# Patient Record
Sex: MALE | Race: BLACK OR AFRICAN AMERICAN | NOT HISPANIC OR LATINO | Employment: FULL TIME | URBAN - METROPOLITAN AREA
[De-identification: names, ages, dates, MRNs, and addresses within clinical notes are randomized per-mention and may not be internally consistent; named-entity substitution may affect disease eponyms.]

---

## 2017-10-03 ENCOUNTER — HOSPITAL ENCOUNTER (EMERGENCY)
Facility: MEDICAL CENTER | Age: 27
End: 2017-10-03
Attending: EMERGENCY MEDICINE
Payer: MEDICAID

## 2017-10-03 VITALS
RESPIRATION RATE: 16 BRPM | HEART RATE: 75 BPM | HEIGHT: 67 IN | DIASTOLIC BLOOD PRESSURE: 70 MMHG | TEMPERATURE: 97.8 F | SYSTOLIC BLOOD PRESSURE: 121 MMHG | BODY MASS INDEX: 26.26 KG/M2 | WEIGHT: 167.33 LBS | OXYGEN SATURATION: 99 %

## 2017-10-03 DIAGNOSIS — M54.50 ACUTE BILATERAL LOW BACK PAIN WITHOUT SCIATICA: ICD-10-CM

## 2017-10-03 PROCEDURE — 700102 HCHG RX REV CODE 250 W/ 637 OVERRIDE(OP): Performed by: EMERGENCY MEDICINE

## 2017-10-03 PROCEDURE — A9270 NON-COVERED ITEM OR SERVICE: HCPCS | Performed by: EMERGENCY MEDICINE

## 2017-10-03 PROCEDURE — 99283 EMERGENCY DEPT VISIT LOW MDM: CPT

## 2017-10-03 RX ORDER — CYCLOBENZAPRINE HCL 10 MG
10 TABLET ORAL 3 TIMES DAILY PRN
Qty: 30 TAB | Refills: 0 | Status: SHIPPED | OUTPATIENT
Start: 2017-10-03 | End: 2017-10-23

## 2017-10-03 RX ORDER — IBUPROFEN 600 MG/1
600 TABLET ORAL ONCE
Status: COMPLETED | OUTPATIENT
Start: 2017-10-03 | End: 2017-10-03

## 2017-10-03 RX ORDER — NAPROXEN 500 MG/1
500 TABLET ORAL 2 TIMES DAILY WITH MEALS
Qty: 60 TAB | Refills: 0 | Status: SHIPPED | OUTPATIENT
Start: 2017-10-03 | End: 2017-10-23

## 2017-10-03 RX ORDER — CYCLOBENZAPRINE HCL 10 MG
10 TABLET ORAL ONCE
Status: DISCONTINUED | OUTPATIENT
Start: 2017-10-03 | End: 2017-10-03 | Stop reason: HOSPADM

## 2017-10-03 RX ADMIN — IBUPROFEN 600 MG: 600 TABLET, FILM COATED ORAL at 09:33

## 2017-10-03 ASSESSMENT — PAIN SCALES - GENERAL: PAINLEVEL_OUTOF10: 8

## 2017-10-03 NOTE — DISCHARGE INSTRUCTIONS
Back Pain, Adult  Back pain is very common in adults. The cause of back pain is rarely dangerous and the pain often gets better over time. The cause of your back pain may not be known. Some common causes of back pain include:  · Strain of the muscles or ligaments supporting the spine.  · Wear and tear (degeneration) of the spinal disks.  · Arthritis.  · Direct injury to the back.  For many people, back pain may return. Since back pain is rarely dangerous, most people can learn to manage this condition on their own.  HOME CARE INSTRUCTIONS  Watch your back pain for any changes. The following actions may help to lessen any discomfort you are feeling:  · Remain active. It is stressful on your back to sit or  one place for long periods of time. Do not sit, drive, or  one place for more than 30 minutes at a time. Take short walks on even surfaces as soon as you are able. Try to increase the length of time you walk each day.  · Exercise regularly as directed by your health care provider. Exercise helps your back heal faster. It also helps avoid future injury by keeping your muscles strong and flexible.  · Do not stay in bed. Resting more than 1-2 days can delay your recovery.  · Pay attention to your body when you bend and lift. The most comfortable positions are those that put less stress on your recovering back. Always use proper lifting techniques, including:  ¨ Bending your knees.  ¨ Keeping the load close to your body.  ¨ Avoiding twisting.  · Find a comfortable position to sleep. Use a firm mattress and lie on your side with your knees slightly bent. If you lie on your back, put a pillow under your knees.  · Avoid feeling anxious or stressed. Stress increases muscle tension and can worsen back pain. It is important to recognize when you are anxious or stressed and learn ways to manage it, such as with exercise.  · Take medicines only as directed by your health care provider. Over-the-counter  medicines to reduce pain and inflammation are often the most helpful. Your health care provider may prescribe muscle relaxant drugs. These medicines help dull your pain so you can more quickly return to your normal activities and healthy exercise.  · Apply ice to the injured area:  ¨ Put ice in a plastic bag.  ¨ Place a towel between your skin and the bag.  ¨ Leave the ice on for 20 minutes, 2-3 times a day for the first 2-3 days. After that, ice and heat may be alternated to reduce pain and spasms.  · Maintain a healthy weight. Excess weight puts extra stress on your back and makes it difficult to maintain good posture.  SEEK MEDICAL CARE IF:  · You have pain that is not relieved with rest or medicine.  · You have increasing pain going down into the legs or buttocks.  · You have pain that does not improve in one week.  · You have night pain.  · You lose weight.  · You have a fever or chills.  SEEK IMMEDIATE MEDICAL CARE IF:   · You develop new bowel or bladder control problems.  · You have unusual weakness or numbness in your arms or legs.  · You develop nausea or vomiting.  · You develop abdominal pain.  · You feel faint.     This information is not intended to replace advice given to you by your health care provider. Make sure you discuss any questions you have with your health care provider.     Document Released: 12/18/2006 Document Revised: 01/08/2016 Document Reviewed: 04/21/2015  [employer] Interactive Patient Education ©2016 [employer]

## 2017-10-03 NOTE — ED NOTES
"Pt ambulatory to triage c/o low back pain, described as \"muscle tightness\" started yesterday.  Denies any recent trauma/injury.   "

## 2017-10-03 NOTE — ED PROVIDER NOTES
"ED Provider Note    Scribed for Vandana Henry M.D. by Jonah Sanford. 10/3/2017  9:16 AM    Primary care provider: Pcp Pt States None  Means of arrival: walk in  History obtained from: patient  History limited by: none    CHIEF COMPLAINT  Chief Complaint   Patient presents with   • Low Back Pain     \"muscle tightness\" started yesterday       HPI  Betsy Pierce is a 26 y.o. male who presents to the Emergency Department complaining of worsening lumbar back pain starting yesterday. He describes his pain as severe. Patient states that he has not taken any medication for his pain. 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 Patient denies incontinence, saddle anaesthesia.      REVIEW OF SYSTEMS  GI: no vomiting, diarrhea, or abdominal pain   : no dysuria, back pain, or hematuria   Neuro: no weakness, numbness, aphasia, headache, incontinence, saddle anaesthesia.  Musculoskeletal: Positive back pain. no swelling, deformity, or joint swelling  Endocrine: no fevers, sweating, or weight loss   SKIN: no rash, erythema, or contusions     See history of present illness.   E.    PAST MEDICAL HISTORY   History of muscle spasms    SURGICAL HISTORY   has a past surgical history that includes hydrocelectomy adult (2010).    SOCIAL HISTORY  Social History   Substance Use Topics   • Smoking status: Current Every Day Smoker     Packs/day: 0.50     Types: Cigarettes   • Smokeless tobacco: No   • Alcohol use No      Comment: occ; quit 2015      History   Drug Use     Comment: marijuana       FAMILY HISTORY  None noted    CURRENT MEDICATIONS  No current facility-administered medications for this encounter.     Current Outpatient Prescriptions:   •  cyclobenzaprine (FLEXERIL) 10 MG Tab, Take 1 Tab by mouth 3 times a day as needed., Disp: 30 Tab, Rfl: 0  •  naproxen (NAPROSYN) 500 MG Tab, Take 1 Tab by mouth 2 times a day, with meals., Disp: 60 Tab, " "Rfl: 0  •  methylPREDNISolone (MEDROL, DONNA,) 4 MG tablet, Use as directed, Disp: 1 Kit, Rfl: 0  •  diazepam (VALIUM) 5 MG Tab, Take 1 Tab by mouth every 6 hours as needed (muscle spasm)., Disp: 30 Tab, Rfl: 0    ALLERGIES  No Known Allergies    PHYSICAL EXAM  VITAL SIGNS: /72   Pulse 72   Temp 36.6 °C (97.8 °F) (Temporal)   Resp 17   Ht 1.702 m (5' 7\")   Wt 75.9 kg (167 lb 5.3 oz)   SpO2 99%   BMI 26.21 kg/m²     Constitutional: Well developed, Well nourished, No acute distress, Non-toxic appearance.   HEENT: Normocephalic, Atraumatic,  external ears normal, pharynx pink,  Mucous  Membranes moist, No rhinorrhea or mucosal edema  Eyes: PERRL, EOMI, Conjunctiva normal, No discharge.   Neck: Normal range of motion, No tenderness, Supple, No stridor.   Lymphatic: No lymphadenopathy    Cardiovascular: Regular Rate and Rhythm, No murmurs,  rubs, or gallops.   Thorax & Lungs: Lungs clear to auscultation bilaterally, No respiratory distress, No wheezes, rhales or rhonchi, No chest wall tenderness.   Abdomen: Bowel sounds normal, Soft, non tender, non distended,  No pulsatile masses., no rebound guarding or peritoneal signs.   Skin: Warm, Dry, No erythema, No rash,   Back:  No CVA tenderness,  Palpable paraspinal muscule spasms. No spinal tenderness, bony crepitance, step offs, or instability.  Neurologic: Alert & oriented x 3, Normal motor function, Normal sensory function, No focal deficits noted. Normal reflexes. Normal Cranial Nerves. Normal DTRs  Extremities: Equal, intact distal pulses, No cyanosis, clubbing or edema,  No tenderness.   Musculoskeletal: Good range of motion in all major joints. No tenderness to palpation or major deformities noted.           COURSE & MEDICAL DECISION MAKING  Nursing notes, VS, PMSFHx reviewed in chart.    9:16 AM - Patient seen and examined at bedside. Patient presents with signs of muscle spasms. He will be referred to follow up with a primary in order to evaluate his " chronic condition. He is instructed to take the day off work and and administer antiinflammatories. He will be prescribed Flexeril 10 mg and Naprosyn 500 mg I instructed him to return with any new or worsening symptoms but specifically if he experiences saddle anesthesia or incontinence, indicating disc involvement. Patient verbalizes understanding and agreement to this plan of care.      The patient will return for new or worsening symptoms and is stable at the time of discharge.    The patient is referred to a primary physician for blood pressure management, diabetic screening, and for all other preventative health concerns.    DISPOSITION:  Patient will be discharged home in stable condition.    FOLLOW UP:  hospitals primary care    Call in 1 day        OUTPATIENT MEDICATIONS:  Discharge Medication List as of 10/3/2017  9:37 AM      START taking these medications    Details   cyclobenzaprine (FLEXERIL) 10 MG Tab Take 1 Tab by mouth 3 times a day as needed., Disp-30 Tab, R-0, Print Rx Paper      naproxen (NAPROSYN) 500 MG Tab Take 1 Tab by mouth 2 times a day, with meals., Disp-60 Tab, R-0, Print Rx Paper               FINAL IMPRESSION  1. Acute bilateral low back pain without sciatica          Jonah ARIAS (Scribe), am scribing for, and in the presence of, Vandana Henry M.D..    Electronically signed by: Jonah Sanford (Alfonso), 10/3/2017    Vandana ARIAS M.D. personally performed the services described in this documentation, as scribed by Jonah Sanford in my presence, and it is both accurate and complete.    The note accurately reflects work and decisions made by me.  Vandana Henry  10/3/2017  1:14 PM

## 2017-10-22 PROCEDURE — 99283 EMERGENCY DEPT VISIT LOW MDM: CPT

## 2017-10-22 ASSESSMENT — PAIN SCALES - GENERAL: PAINLEVEL_OUTOF10: 6

## 2017-10-23 ENCOUNTER — HOSPITAL ENCOUNTER (EMERGENCY)
Facility: MEDICAL CENTER | Age: 27
End: 2017-10-23
Attending: EMERGENCY MEDICINE
Payer: MEDICAID

## 2017-10-23 VITALS
WEIGHT: 167.55 LBS | BODY MASS INDEX: 26.3 KG/M2 | HEIGHT: 67 IN | TEMPERATURE: 98.8 F | DIASTOLIC BLOOD PRESSURE: 67 MMHG | SYSTOLIC BLOOD PRESSURE: 110 MMHG | RESPIRATION RATE: 20 BRPM | HEART RATE: 87 BPM | OXYGEN SATURATION: 99 %

## 2017-10-23 DIAGNOSIS — B34.9 VIRAL ILLNESS: ICD-10-CM

## 2017-10-23 PROCEDURE — A9270 NON-COVERED ITEM OR SERVICE: HCPCS | Performed by: EMERGENCY MEDICINE

## 2017-10-23 PROCEDURE — 700102 HCHG RX REV CODE 250 W/ 637 OVERRIDE(OP): Performed by: EMERGENCY MEDICINE

## 2017-10-23 RX ORDER — IBUPROFEN 600 MG/1
600 TABLET ORAL ONCE
Status: COMPLETED | OUTPATIENT
Start: 2017-10-23 | End: 2017-10-23

## 2017-10-23 RX ADMIN — IBUPROFEN 600 MG: 600 TABLET, FILM COATED ORAL at 02:06

## 2017-10-23 ASSESSMENT — PAIN SCALES - WONG BAKER: WONGBAKER_NUMERICALRESPONSE: HURTS JUST A LITTLE BIT

## 2017-10-23 NOTE — ED NOTES
PPT IS AOAX4, PT IS IN Ad, PT IS BEING D/C, PT WAS GIVEN D/C INSTRUCTIONS, PT IS IN NAD. PT IS ABLE TO AMB WOI ASSITANCE. PT IS ABLE TO AMB WOI ASSISTANCE.

## 2017-10-23 NOTE — ED NOTES
Betsy Pierce  26 y.o.  Chief Complaint   Patient presents with   • Cough     x1 week, productive, clear mucous.   • Nausea     deneis emesis   • Nasal Congestion     x1 week   • Headache     only when coughing     Patient denies taking medication for symptoms.      Explained wait time and triage process to pt. Pt placed back out in lobby, told to notify ED tech or triage RN of any changes, verbalized understanding.

## 2017-10-23 NOTE — DISCHARGE INSTRUCTIONS
"Viral Infections  A viral infection can be caused by different types of viruses. Most viral infections are not serious and resolve on their own. However, some infections may cause severe symptoms and may lead to further complications.  SYMPTOMS  Viruses can frequently cause:  · Minor sore throat.  · Aches and pains.  · Headaches.  · Runny nose.  · Different types of rashes.  · Watery eyes.  · Tiredness.  · Cough.  · Loss of appetite.  · Gastrointestinal infections, resulting in nausea, vomiting, and diarrhea.  These symptoms do not respond to antibiotics because the infection is not caused by bacteria. However, you might catch a bacterial infection following the viral infection. This is sometimes called a \"superinfection.\" Symptoms of such a bacterial infection may include:  · Worsening sore throat with pus and difficulty swallowing.  · Swollen neck glands.  · Chills and a high or persistent fever.  · Severe headache.  · Tenderness over the sinuses.  · Persistent overall ill feeling (malaise), muscle aches, and tiredness (fatigue).  · Persistent cough.  · Yellow, green, or brown mucus production with coughing.  HOME CARE INSTRUCTIONS   · Only take over-the-counter or prescription medicines for pain, discomfort, diarrhea, or fever as directed by your caregiver.  · Drink enough water and fluids to keep your urine clear or pale yellow. Sports drinks can provide valuable electrolytes, sugars, and hydration.  · Get plenty of rest and maintain proper nutrition. Soups and broths with crackers or rice are fine.  SEEK IMMEDIATE MEDICAL CARE IF:   · You have severe headaches, shortness of breath, chest pain, neck pain, or an unusual rash.  · You have uncontrolled vomiting, diarrhea, or you are unable to keep down fluids.  · You or your child has an oral temperature above 102° F (38.9° C), not controlled by medicine.  · Your baby is older than 3 months with a rectal temperature of 102° F (38.9° C) or higher.  · Your baby is 3 " months old or younger with a rectal temperature of 100.4° F (38° C) or higher.  MAKE SURE YOU:   · Understand these instructions.  · Will watch your condition.  · Will get help right away if you are not doing well or get worse.     This information is not intended to replace advice given to you by your health care provider. Make sure you discuss any questions you have with your health care provider.     Document Released: 09/27/2006 Document Revised: 03/11/2013 Document Reviewed: 04/23/2012  [employer] Interactive Patient Education ©2016 [employer]

## 2017-10-23 NOTE — ED PROVIDER NOTES
"ED Provider Note    CHIEF COMPLAINT  Chief Complaint   Patient presents with   • Cough     x1 week, productive, clear mucous.   • Nausea     deneis emesis   • Nasal Congestion     x1 week   • Headache     only when coughing       HPI  Betsy Pierce is a 26 y.o. male who presentsTo the emergency department with a cough. The patient states an sick over the last 3-5 days. He's had a cough as well as nasal congestion. Patient's also had associated headache when he coughs. He has not had any fevers. He does have some slight nausea but no vomiting. He has had sick contacts at home.    REVIEW OF SYSTEMS  No rashes    PHYSICAL EXAM  VITAL SIGNS: /67   Pulse 87   Temp 37.1 °C (98.8 °F)   Resp 20   Ht 1.702 m (5' 7\")   Wt 76 kg (167 lb 8.8 oz)   SpO2 99%   BMI 26.24 kg/m²   In general the patient does not appear toxic  Ears tympanic membranes are retracted bilaterally but nonerythematous, nares have swollen turbinates with rhinorrhea, oral pharynx not erythematous  Neck is supple without adenopathy  Pulmonary chest clear to auscultation bilaterally      COURSE & MEDICAL DECISION MAKING  Pertinent Labs & Imaging studies reviewed. (See chart for details)  This a 26-year-old male who presents to the emergency department with signs and symptoms consistent with a viral upper rest for infection. Therefore the patient be treated supportively. He'll return if he is acutely worse or does not have significant improvement in 48-72 hours.    FINAL IMPRESSION  1. Viral upper respiratory infection     Disposition  The patient will be discharged in stable condition      Electronically signed by: Gregor Montesinos, 10/23/2017 2:03 AM      "

## 2017-11-11 ENCOUNTER — HOSPITAL ENCOUNTER (EMERGENCY)
Facility: MEDICAL CENTER | Age: 27
End: 2017-11-11
Attending: EMERGENCY MEDICINE
Payer: MEDICAID

## 2017-11-11 VITALS
RESPIRATION RATE: 12 BRPM | TEMPERATURE: 98.7 F | WEIGHT: 162.7 LBS | HEART RATE: 80 BPM | BODY MASS INDEX: 25.54 KG/M2 | HEIGHT: 67 IN | SYSTOLIC BLOOD PRESSURE: 120 MMHG | OXYGEN SATURATION: 98 % | DIASTOLIC BLOOD PRESSURE: 68 MMHG

## 2017-11-11 DIAGNOSIS — M54.50 ACUTE EXACERBATION OF CHRONIC LOW BACK PAIN: ICD-10-CM

## 2017-11-11 DIAGNOSIS — G89.29 ACUTE EXACERBATION OF CHRONIC LOW BACK PAIN: ICD-10-CM

## 2017-11-11 DIAGNOSIS — S39.012A LUMBOSACRAL STRAIN, INITIAL ENCOUNTER: ICD-10-CM

## 2017-11-11 PROCEDURE — A9270 NON-COVERED ITEM OR SERVICE: HCPCS | Performed by: EMERGENCY MEDICINE

## 2017-11-11 PROCEDURE — 700111 HCHG RX REV CODE 636 W/ 250 OVERRIDE (IP): Performed by: EMERGENCY MEDICINE

## 2017-11-11 PROCEDURE — 700102 HCHG RX REV CODE 250 W/ 637 OVERRIDE(OP): Performed by: EMERGENCY MEDICINE

## 2017-11-11 PROCEDURE — 96372 THER/PROPH/DIAG INJ SC/IM: CPT

## 2017-11-11 PROCEDURE — 99284 EMERGENCY DEPT VISIT MOD MDM: CPT

## 2017-11-11 RX ORDER — DEXAMETHASONE SODIUM PHOSPHATE 4 MG/ML
10 INJECTION, SOLUTION INTRA-ARTICULAR; INTRALESIONAL; INTRAMUSCULAR; INTRAVENOUS; SOFT TISSUE ONCE
Status: COMPLETED | OUTPATIENT
Start: 2017-11-11 | End: 2017-11-11

## 2017-11-11 RX ORDER — KETOROLAC TROMETHAMINE 30 MG/ML
30 INJECTION, SOLUTION INTRAMUSCULAR; INTRAVENOUS ONCE
Status: COMPLETED | OUTPATIENT
Start: 2017-11-11 | End: 2017-11-11

## 2017-11-11 RX ORDER — IBUPROFEN 600 MG/1
600 TABLET ORAL EVERY 6 HOURS PRN
Qty: 20 TAB | Refills: 0 | Status: SHIPPED | OUTPATIENT
Start: 2017-11-11 | End: 2019-04-10

## 2017-11-11 RX ORDER — METHOCARBAMOL 500 MG/1
500 TABLET, FILM COATED ORAL ONCE
Status: COMPLETED | OUTPATIENT
Start: 2017-11-11 | End: 2017-11-11

## 2017-11-11 RX ORDER — METHOCARBAMOL 500 MG/1
500 TABLET, FILM COATED ORAL EVERY 6 HOURS PRN
Qty: 20 TAB | Refills: 0 | Status: SHIPPED | OUTPATIENT
Start: 2017-11-11 | End: 2019-04-10

## 2017-11-11 RX ADMIN — KETOROLAC TROMETHAMINE 30 MG: 30 INJECTION, SOLUTION INTRAMUSCULAR at 03:15

## 2017-11-11 RX ADMIN — DEXAMETHASONE SODIUM PHOSPHATE 10 MG: 4 INJECTION, SOLUTION INTRAMUSCULAR; INTRAVENOUS at 03:15

## 2017-11-11 RX ADMIN — METHOCARBAMOL 500 MG: 500 TABLET ORAL at 03:35

## 2017-11-11 ASSESSMENT — PAIN SCALES - GENERAL: PAINLEVEL_OUTOF10: 8

## 2017-11-11 NOTE — ED NOTES
Discharge instructions given. All questions answered. Prescriptions given x2. Pt to follow-up with PCP. Pt verbalized understanding. All belongings with pt. Pt to lobby self ambulatory.

## 2017-11-11 NOTE — DISCHARGE INSTRUCTIONS
Follow-up with primary care next week for reevaluation,, to establish care, medication management and close blood pressure monitoring.    Motrin and Robaxin every 6 hours as needed for back pain or spasm.    Slow stretching and range of motion exercises as described. Activity weightbearing as tolerated.    Return to the emergency department for persistent or worsening back pain, lower extremity weakness or paresthesias, incontinence or urinary retention, fever or other new concerns.      Lumbosacral Strain  Lumbosacral strain is a strain of any of the parts that make up your lumbosacral vertebrae. Your lumbosacral vertebrae are the bones that make up the lower third of your backbone. Your lumbosacral vertebrae are held together by muscles and tough, fibrous tissue (ligaments).   CAUSES   A sudden blow to your back can cause lumbosacral strain. Also, anything that causes an excessive stretch of the muscles in the low back can cause this strain. This is typically seen when people exert themselves strenuously, fall, lift heavy objects, bend, or crouch repeatedly.  RISK FACTORS  · Physically demanding work.  · Participation in pushing or pulling sports or sports that require a sudden twist of the back (tennis, golf, baseball).  · Weight lifting.  · Excessive lower back curvature.  · Forward-tilted pelvis.  · Weak back or abdominal muscles or both.  · Tight hamstrings.  SIGNS AND SYMPTOMS   Lumbosacral strain may cause pain in the area of your injury or pain that moves (radiates) down your leg.   DIAGNOSIS  Your health care provider can often diagnose lumbosacral strain through a physical exam. In some cases, you may need tests such as X-ray exams.   TREATMENT   Treatment for your lower back injury depends on many factors that your clinician will have to evaluate. However, most treatment will include the use of anti-inflammatory medicines.  HOME CARE INSTRUCTIONS   · Avoid hard physical activities (tennis, racquetball,  waterskiing) if you are not in proper physical condition for it. This may aggravate or create problems.  · If you have a back problem, avoid sports requiring sudden body movements. Swimming and walking are generally safer activities.  · Maintain good posture.  · Maintain a healthy weight.  · For acute conditions, you may put ice on the injured area.  ¨ Put ice in a plastic bag.  ¨ Place a towel between your skin and the bag.  ¨ Leave the ice on for 20 minutes, 2-3 times a day.  · When the low back starts healing, stretching and strengthening exercises may be recommended.  SEEK MEDICAL CARE IF:  · Your back pain is getting worse.  · You experience severe back pain not relieved with medicines.  SEEK IMMEDIATE MEDICAL CARE IF:   · You have numbness, tingling, weakness, or problems with the use of your arms or legs.  · There is a change in bowel or bladder control.  · You have increasing pain in any area of the body, including your belly (abdomen).  · You notice shortness of breath, dizziness, or feel faint.  · You feel sick to your stomach (nauseous), are throwing up (vomiting), or become sweaty.  · You notice discoloration of your toes or legs, or your feet get very cold.  MAKE SURE YOU:   · Understand these instructions.  · Will watch your condition.  · Will get help right away if you are not doing well or get worse.     This information is not intended to replace advice given to you by your health care provider. Make sure you discuss any questions you have with your health care provider.     Document Released: 09/27/2006 Document Revised: 01/08/2016 Document Reviewed: 08/06/2014  [employer] Interactive Patient Education ©2016 [employer]      Back Exercises  Back exercises help treat and prevent back injuries. The goal is to increase your strength in your belly (abdominal) and back muscles. These exercises can also help with flexibility. Start these exercises when told by your doctor.  HOME CARE  Back exercises  include:  Pelvic Tilt.  · Lie on your back with your knees bent. Tilt your pelvis until the lower part of your back is against the floor. Hold this position 5 to 10 sec. Repeat this exercise 5 to 10 times.  Knee to Chest.  · Pull 1 knee up against your chest and hold for 20 to 30 seconds. Repeat this with the other knee. This may be done with the other leg straight or bent, whichever feels better. Then, pull both knees up against your chest.  Sit-Ups or Curl-Ups.  · Bend your knees 90 degrees. Start with tilting your pelvis, and do a partial, slow sit-up. Only lift your upper half 30 to 45 degrees off the floor. Take at least 2 to 3 seonds for each sit-up. Do not do sit-ups with your knees out straight. If partial sit-ups are difficult, simply do the above but with only tightening your belly (abdominal) muscles and holding it as told.  Hip-Lift.  · Lie on your back with your knees flexed 90 degrees. Push down with your feet and shoulders as you raise your hips 2 inches off the floor. Hold for 10 seconds, repeat 5 to 10 times.  Back Arches.  · Lie on your stomach. Prop yourself up on bent elbows. Slowly press on your hands, causing an arch in your low back. Repeat 3 to 5 times.  Shoulder-Lifts.  · Lie face down with arms beside your body. Keep hips and belly pressed to floor as you slowly lift your head and shoulders off the floor.  Do not overdo your exercises. Be careful in the beginning. Exercises may cause you some mild back discomfort. If the pain lasts for more than 15 minutes, stop the exercises until you see your doctor. Improvement with exercise for back problems is slow.      This information is not intended to replace advice given to you by your health care provider. Make sure you discuss any questions you have with your health care provider.     Document Released: 01/20/2012 Document Revised: 03/11/2013 Document Reviewed: 02/11/2016  [employer] Interactive Patient Education ©2016 [employer]

## 2017-11-11 NOTE — ED NOTES
"Chief Complaint   Patient presents with   • Back Pain     /81   Pulse 89   Temp 37.1 °C (98.7 °F)   Resp 14   Ht 1.702 m (5' 7\")   Wt 73.8 kg (162 lb 11.2 oz)   SpO2 98%   BMI 25.48 kg/m²     Pt ambulatory to triage, steady on feet. Pt reports he normally has back spasms that come and go on their own, does not take any medication for it. States his back began to hurt yesterday afternoon and has gradually gotten worse and is \"here with a vengeance this time.\" Rates 8/10. Describes as entire lower back. Pt returned to [facility], educated on triage process and instructed to notify staff of worsening symptoms.   "

## 2017-11-11 NOTE — ED PROVIDER NOTES
"ED Provider Note    CHIEF COMPLAINT  Chief Complaint   Patient presents with   • Back Pain       HPI  Betsy Pierce is a 26 y.o. male who Ambulates to the emergency department for low back pain and spasm. Patient describes history of similar symptoms intermittently over the last 1 or 2 years. 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 2 days of progressive low back pain, tightness.  Pain    is nonradiating. No lower extremity weakness or paresthesias. No incontinence or urinary retention. Patient denies IV drug use. Denies abdominal pain. Denies fever or chills. Patient denies any new trauma, injury or strain pattern. No medications or been taken for discomfort. Patient states given Flexeril previously for similar complaints, but it was ineffective \"and just made me tired.\" Denies IV drug use.    REVIEW OF SYSTEMS  See HPI for further details.     PAST MEDICAL HISTORY   has a past medical history of Known health problems: none.    SOCIAL HISTORY  Social History     Social History Main Topics   • Smoking status: Current Every Day Smoker     Packs/day: 0.50     Types: Cigarettes   • Smokeless tobacco: Never Used   • Alcohol use No      Comment: occ; quit 2015   • Drug use:       Comment: marijuana   • Sexual activity: Not on file       SURGICAL HISTORY   has a past surgical history that includes hydrocelectomy adult (2010).    CURRENT MEDICATIONS  Home Medications     Reviewed by Anuja Novak R.N. (Registered Nurse) on 11/11/17 at 0236  Med List Status: Partial   Medication Last Dose Status   diazepam (VALIUM) 5 MG Tab 10/1/2017 Active                ALLERGIES  No Known Allergies    PHYSICAL EXAM  VITAL SIGNS: /81   Pulse 89   Temp 37.1 °C (98.7 °F)   Resp 14   Ht 1.702 m (5' 7\")   Wt 73.8 kg (162 lb 11.2 oz)   SpO2 98%   BMI 25.48 kg/m²   Pulse ox interpretation: I interpret " this pulse ox as normal.  Constitutional: Alert in no apparent distress.  HENT: Normocephalic, atraumatic. Bilateral external ears normal, Nose normal. Moist mucous membranes.    Eyes: Pupils are equal and reactive, Conjunctiva normal.   Neck: Normal range of motion, Supple.   Lymphatic: No lymphadenopathy noted.   Cardiovascular:Normal peripheral perfusion.  Thorax & Lungs: Nonlabored respirations.  Abdomen: Soft, non-distended, non-tender to palpation. No palpable or pulsatile masses.   Skin: Warm, Dry, No erythema, No rash.   Musculoskeletal: Good range of motion in all major joints. No major deformities noted. No midline tenderness to palpation thoracic or lumbar spine. Diffuse low lumbar paravertebral tenderness bilaterally that extends over the sacral region.  Neurologic: Alert , no gross focal deficit noted. 5 out of 5 strength bilateral lower extremities. Sensation intact to light touch, no saddle anesthesia. Straight leg raise intact bilaterally with mild discomfort. And relates independently.  Psychiatric: Affect normal, Judgment normal, Mood normal.     COURSE & MEDICAL DECISION MAKING  Nursing notes and vital signs were reviewed. (See chart for details)  The patients records were reviewed, history was obtained from the patient ;     Evaluation is most consistent with acute exacerbation of chronic low back pain, suspect lumbosacral strain. No history or clinical evidence for radiculopathy. Physical exam as described above with reproducible paravertebral tenderness, otherwise unremarkable and patient is neurologically intact. He patel weight and ambulates independently. Abdominal exam is benign. No history or clinical evidence to suggest cauda equina, epidural abscess or spinal cord compression syndrome. Vital signs are stable without fever or tachycardia. Patient given Decadron, Toradol and Robaxin in the emergency department.    Patient is stable for discharge at this time, anticipatory guidance  provided,, Motrin and Robaxin for discomfort or spasm, close follow-up is encouraged, and strict ED return instructions have been detailed. Patient is agreeable to the disposition and plan.    Patient's blood pressure was elevated in the emergency department, and has been referred to primary care for close monitoring.     reviewed, no pattern for concern.    FINAL IMPRESSION  (M54.5,  G89.29) Acute exacerbation of chronic low back pain  (S39.012A) Lumbosacral strain, initial encounter      Electronically signed by: Kaitlin Briggs, 11/11/2017 3:00 AM      This dictation was created using voice recognition software. The accuracy of the dictation is limited to the abilities of the software. I expect there may be some errors of grammar and possibly content. The nursing notes were reviewed and certain aspects of this information were incorporated into this note.

## 2017-11-14 ENCOUNTER — HOSPITAL ENCOUNTER (EMERGENCY)
Facility: MEDICAL CENTER | Age: 27
End: 2017-11-14
Attending: EMERGENCY MEDICINE
Payer: MEDICAID

## 2017-11-14 VITALS
WEIGHT: 167.11 LBS | HEIGHT: 67 IN | OXYGEN SATURATION: 98 % | HEART RATE: 73 BPM | TEMPERATURE: 98 F | DIASTOLIC BLOOD PRESSURE: 80 MMHG | SYSTOLIC BLOOD PRESSURE: 119 MMHG | RESPIRATION RATE: 23 BRPM | BODY MASS INDEX: 26.23 KG/M2

## 2017-11-14 DIAGNOSIS — R10.33 PERIUMBILICAL ABDOMINAL PAIN: ICD-10-CM

## 2017-11-14 DIAGNOSIS — F17.200 TOBACCO DEPENDENCE: ICD-10-CM

## 2017-11-14 LAB
ALBUMIN SERPL BCP-MCNC: 3.9 G/DL (ref 3.2–4.9)
ALBUMIN/GLOB SERPL: 1.4 G/DL
ALP SERPL-CCNC: 61 U/L (ref 30–99)
ALT SERPL-CCNC: 10 U/L (ref 2–50)
ANION GAP SERPL CALC-SCNC: 6 MMOL/L (ref 0–11.9)
AST SERPL-CCNC: 15 U/L (ref 12–45)
BASOPHILS # BLD AUTO: 0.7 % (ref 0–1.8)
BASOPHILS # BLD: 0.06 K/UL (ref 0–0.12)
BILIRUB SERPL-MCNC: 0.5 MG/DL (ref 0.1–1.5)
BUN SERPL-MCNC: 14 MG/DL (ref 8–22)
CALCIUM SERPL-MCNC: 9.1 MG/DL (ref 8.5–10.5)
CHLORIDE SERPL-SCNC: 106 MMOL/L (ref 96–112)
CO2 SERPL-SCNC: 28 MMOL/L (ref 20–33)
CREAT SERPL-MCNC: 0.77 MG/DL (ref 0.5–1.4)
EOSINOPHIL # BLD AUTO: 0.22 K/UL (ref 0–0.51)
EOSINOPHIL NFR BLD: 2.4 % (ref 0–6.9)
ERYTHROCYTE [DISTWIDTH] IN BLOOD BY AUTOMATED COUNT: 37.5 FL (ref 35.9–50)
GFR SERPL CREATININE-BSD FRML MDRD: >60 ML/MIN/1.73 M 2
GLOBULIN SER CALC-MCNC: 2.8 G/DL (ref 1.9–3.5)
GLUCOSE SERPL-MCNC: 88 MG/DL (ref 65–99)
HCT VFR BLD AUTO: 42.7 % (ref 42–52)
HGB BLD-MCNC: 14.6 G/DL (ref 14–18)
IMM GRANULOCYTES # BLD AUTO: 0.03 K/UL (ref 0–0.11)
IMM GRANULOCYTES NFR BLD AUTO: 0.3 % (ref 0–0.9)
LIPASE SERPL-CCNC: 24 U/L (ref 11–82)
LYMPHOCYTES # BLD AUTO: 3.57 K/UL (ref 1–4.8)
LYMPHOCYTES NFR BLD: 38.8 % (ref 22–41)
MCH RBC QN AUTO: 28.2 PG (ref 27–33)
MCHC RBC AUTO-ENTMCNC: 34.2 G/DL (ref 33.7–35.3)
MCV RBC AUTO: 82.4 FL (ref 81.4–97.8)
MONOCYTES # BLD AUTO: 0.81 K/UL (ref 0–0.85)
MONOCYTES NFR BLD AUTO: 8.8 % (ref 0–13.4)
NEUTROPHILS # BLD AUTO: 4.5 K/UL (ref 1.82–7.42)
NEUTROPHILS NFR BLD: 49 % (ref 44–72)
NRBC # BLD AUTO: 0 K/UL
NRBC BLD AUTO-RTO: 0 /100 WBC
PLATELET # BLD AUTO: 397 K/UL (ref 164–446)
PMV BLD AUTO: 8.9 FL (ref 9–12.9)
POTASSIUM SERPL-SCNC: 3.6 MMOL/L (ref 3.6–5.5)
PROT SERPL-MCNC: 6.7 G/DL (ref 6–8.2)
RBC # BLD AUTO: 5.18 M/UL (ref 4.7–6.1)
SODIUM SERPL-SCNC: 140 MMOL/L (ref 135–145)
WBC # BLD AUTO: 9.2 K/UL (ref 4.8–10.8)

## 2017-11-14 PROCEDURE — 99284 EMERGENCY DEPT VISIT MOD MDM: CPT

## 2017-11-14 PROCEDURE — 36415 COLL VENOUS BLD VENIPUNCTURE: CPT

## 2017-11-14 PROCEDURE — 96372 THER/PROPH/DIAG INJ SC/IM: CPT

## 2017-11-14 PROCEDURE — A9270 NON-COVERED ITEM OR SERVICE: HCPCS | Performed by: EMERGENCY MEDICINE

## 2017-11-14 PROCEDURE — 83690 ASSAY OF LIPASE: CPT

## 2017-11-14 PROCEDURE — 85025 COMPLETE CBC W/AUTO DIFF WBC: CPT

## 2017-11-14 PROCEDURE — 700111 HCHG RX REV CODE 636 W/ 250 OVERRIDE (IP): Performed by: EMERGENCY MEDICINE

## 2017-11-14 PROCEDURE — 700102 HCHG RX REV CODE 250 W/ 637 OVERRIDE(OP): Performed by: EMERGENCY MEDICINE

## 2017-11-14 PROCEDURE — 80053 COMPREHEN METABOLIC PANEL: CPT

## 2017-11-14 RX ORDER — DICYCLOMINE HCL 20 MG
20 TABLET ORAL EVERY 6 HOURS
Qty: 20 TAB | Refills: 0 | Status: SHIPPED | OUTPATIENT
Start: 2017-11-14 | End: 2019-04-10

## 2017-11-14 RX ORDER — NAPROXEN 250 MG/1
250 TABLET ORAL ONCE
Status: COMPLETED | OUTPATIENT
Start: 2017-11-14 | End: 2017-11-14

## 2017-11-14 RX ORDER — DICYCLOMINE HYDROCHLORIDE 10 MG/ML
20 INJECTION INTRAMUSCULAR ONCE
Status: COMPLETED | OUTPATIENT
Start: 2017-11-14 | End: 2017-11-14

## 2017-11-14 RX ORDER — NAPROXEN 500 MG/1
500 TABLET ORAL 2 TIMES DAILY WITH MEALS
Qty: 20 TAB | Refills: 0 | Status: SHIPPED | OUTPATIENT
Start: 2017-11-14 | End: 2017-11-26

## 2017-11-14 RX ORDER — ONDANSETRON 4 MG/1
4 TABLET, ORALLY DISINTEGRATING ORAL ONCE
Status: COMPLETED | OUTPATIENT
Start: 2017-11-14 | End: 2017-11-14

## 2017-11-14 RX ADMIN — NAPROXEN 250 MG: 250 TABLET ORAL at 02:19

## 2017-11-14 RX ADMIN — DICYCLOMINE HYDROCHLORIDE 20 MG: 10 INJECTION INTRAMUSCULAR at 02:19

## 2017-11-14 RX ADMIN — ONDANSETRON 4 MG: 4 TABLET, ORALLY DISINTEGRATING ORAL at 02:19

## 2017-11-14 NOTE — ED PROVIDER NOTES
"ED Provider Note    Scribed for Justus Bee M.D. by Justus Bee. 11/14/2017,  2:01 AM.    CHIEF COMPLAINT  Chief Complaint   Patient presents with   • Abdominal Pain     middle of abdomen x 1 day    • Nausea       HPI  Betsy Pierce is a 26 y.o. male who presents to the Emergency DepartmentFor abdominal pain this is his 4th visit since October 3, and the 2nd for abdominal pain. He reports moderate to severe pain around the umbilicus which started yesterday. He reports nausea but no vomiting, no fevers or chills. He reports that he was started on Robaxin 3 days ago for chronic back spasms and wonders if maybe the medication as the cause of his abdominal discomfort. Pain is worse with palpation, slightly relieved by lying on his stomach. He denies constipation or diarrhea. His last bowel movement was yesterday and was soft.      REVIEW OF SYSTEMS  See HPI for further details. All other systems are negative.     PAST MEDICAL HISTORY   has a past medical history of Known health problems: none.    SOCIAL HISTORY  Social History     Social History Main Topics   • Smoking status: Current Every Day Smoker     Packs/day: 0.50     Types: Cigarettes   • Smokeless tobacco: Never Used   • Alcohol use No      Comment: occ; quit 2015   • Drug use:       Comment: marijuana   • Sexual activity: Not on file     History   Drug Use     Comment: marijuana       SURGICAL HISTORY   has a past surgical history that includes hydrocelectomy adult (2010).    CURRENT MEDICATIONS  Home Medications     Reviewed by Seble Sal R.N. (Registered Nurse) on 11/14/17 at 0206  Med List Status: Unable to Obtain   Medication Last Dose Status   diazepam (VALIUM) 5 MG Tab 10/1/2017 Active   ibuprofen (MOTRIN) 600 MG Tab  Active   methocarbamol (ROBAXIN) 500 MG Tab  Active                ALLERGIES  No Known Allergies    PHYSICAL EXAM  VITAL SIGNS: /80   Pulse 73   Temp 36.7 °C (98 °F)   Resp (!) 23   Ht 1.702 m (5' 7\")   Wt " 75.8 kg (167 lb 1.7 oz)   SpO2 98%   BMI 26.17 kg/m²   Pulse ox interpretation: I interpret this pulse ox as normal.  Constitutional: Alert in no apparent distress.  HENT: No signs of trauma, Bilateral external ears normal, Nose normal.   Eyes: Conjunctiva normal, Non-icteric.   Neck: Normal range of motion, Supple, No stridor.   Lymphatic: No lymphadenopathy noted.   Cardiovascular: Regular rate and rhythm, no murmurs.   Thorax & Lungs: Normal breath sounds, No respiratory distress, No wheezing, No chest tenderness.   Abdomen: Bowel sounds normal, Soft, No tenderness, No masses, No pulsatile masses. No peritoneal signs.  Skin: Warm, Dry, No erythema, No rash.   Back: No CVA tenderness.  Extremities: Intact distal pulses, No edema, No cyanosis.  Musculoskeletal: Good range of motion in all major joints. No or major deformities noted.   Neurologic: Alert , Normal motor function, Normal sensory function, No focal deficits noted.   Psychiatric: Affect anxious, unusual, Judgment normal, Mood normal.     DIAGNOSTIC STUDIES / PROCEDURES    LABS  Labs Reviewed   CBC WITH DIFFERENTIAL - Abnormal; Notable for the following:        Result Value    MPV 8.9 (*)     All other components within normal limits   COMP METABOLIC PANEL   LIPASE   ESTIMATED GFR   POC URINALYSIS     All labs reviewed by me.    RADIOLOGY  No orders to display     The radiologist's interpretation of all radiological studies have been reviewed by me.    COURSE & MEDICAL DECISION MAKING  Nursing notes, VS, PMSFHx reviewed in chart.     2:01 AM Patient seen and examined at bedside. Differential diagnosis includes but is not limited to Abdominal pain, abdominal cramps, viral illness, medication side effect, far less likely pancreatitis, hepatobiliary disease or appendicitis, given his benign exam and normal vital signs. Ordered for laboratory tests to evaluate. Patient will be treated with Bentyl, Zofran, and Naprosyn for his symptoms.     This patient's  labs were unremarkable. There is no evidence of infection. His vital signs are unremarkable. He had no peritoneal signs, and there were no localizing findings on his abdominal exam. Explained that it is at least possible that he is early in infectious process such as appendicitis, and that he should return for worsening symptoms, but his symptoms at this point are not consistent with an emergent medical condition, and he can be safely discharged. He'll be prescribed Bentyl and Naprosyn for his symptoms.     The patient will return for new or worsening symptoms and is stable at the time of discharge.    The patient is referred to a primary physician for blood pressure management, diabetic screening, and for all other preventative health concerns.    DISPOSITION:  Patient will be discharged home in stable condition.    FOLLOW UP:  No follow-up provider specified.    OUTPATIENT MEDICATIONS:  Discharge Medication List as of 11/14/2017  3:05 AM      START taking these medications    Details   dicyclomine (BENTYL) 20 MG Tab Take 1 Tab by mouth every 6 hours., Disp-20 Tab, R-0, Print Rx Paper      naproxen (NAPROSYN) 500 MG Tab Take 1 Tab by mouth 2 times a day, with meals., Disp-20 Tab, R-0, Print Rx Paper               FINAL IMPRESSION  1. Periumbilical abdominal pain    2. Tobacco dependence

## 2017-11-14 NOTE — ED NOTES
"Triage notes    Pt c/o of abdominal pain in middle of abdomen started yesterday, pt denies vomiting, states regular BM's but was started on a new medication for his back spasms and is concerned it may be that.     .  Chief Complaint   Patient presents with   • Abdominal Pain     middle of abdomen x 1 day    • Nausea     ./80   Pulse 73   Temp 36.7 °C (98 °F)   Resp 16   Ht 1.702 m (5' 7\")   Wt 75.8 kg (167 lb 1.7 oz)   SpO2 98%   BMI 26.17 kg/m²     "

## 2017-11-14 NOTE — DISCHARGE INSTRUCTIONS
Your laboratory tests were normal. The cause of your abdominal pain is not clear. There is no evidence of any dangerous cause, but it could be too early in the course of something like appendicitis or other problem for us to see it on your lab tests. Use the medications that we have prescribed, follow up with her primary care doctor, and return to the emergency department for severe symptoms that can't be managed by your primary care doctor.    Abdominal Pain, Possible Early Appendicitis  Abdominal (belly) pain can be caused by many things. Your caregiver decides the seriousness of your pain by an exam and possibly blood tests and X-rays. Many cases can be observed and treated at home. Most abdominal pain in children is functional. This means it is not caused by a disease. It will probably improve without treatment.  At this time, your caregiver feels that the abdominal pain could possibly be caused by early appendicitis. This means that you will require follow-up. You may be allowed to go home but may need to return for re-examination and repeat lab work.   HOME CARE INSTRUCTIONS   · Do not take or give laxatives unless directed by your caregiver.   · Take pain medication only if ordered by your caregiver.   · Take no food or water by mouth unless instructed to do so by your caregiver.   SEEK IMMEDIATE MEDICAL CARE IF:   · The pain does not go away or becomes much worse.   · An oral temperature above 102° F (38.9° C) develops.   · Repeated vomiting occurs.   · Blood is being passed in stools (bright red or black tarry stools).   · You develop blood in the urine or cannot pass your urine.   · You develop severe pain in other parts of your body.   MAKE SURE YOU:   · Understand these instructions.   · Will watch your condition.   · Will get help right away if you are not doing well or get worse.   Document Released: 01/06/2009 Document Revised: 03/11/2013 Document Reviewed: 01/06/2009  [employer]® Patient Information  ©2013 [facility].

## 2017-11-14 NOTE — ED NOTES
ERP at bedside, pt laying on stomach for comfort. VSS. On monitor. Pt states normal BM today, denies painful urination, states hx of chronic back spasms.

## 2017-11-26 ENCOUNTER — HOSPITAL ENCOUNTER (EMERGENCY)
Facility: MEDICAL CENTER | Age: 27
End: 2017-11-26
Attending: EMERGENCY MEDICINE
Payer: MEDICAID

## 2017-11-26 VITALS
TEMPERATURE: 96.9 F | HEART RATE: 63 BPM | HEIGHT: 68 IN | OXYGEN SATURATION: 100 % | WEIGHT: 162.26 LBS | SYSTOLIC BLOOD PRESSURE: 130 MMHG | RESPIRATION RATE: 14 BRPM | BODY MASS INDEX: 24.59 KG/M2 | DIASTOLIC BLOOD PRESSURE: 76 MMHG

## 2017-11-26 DIAGNOSIS — M72.2 PLANTAR FASCIITIS, BILATERAL: ICD-10-CM

## 2017-11-26 PROCEDURE — 99282 EMERGENCY DEPT VISIT SF MDM: CPT

## 2017-11-26 RX ORDER — NAPROXEN 500 MG/1
500 TABLET ORAL 2 TIMES DAILY WITH MEALS
Qty: 40 TAB | Refills: 0 | Status: SHIPPED | OUTPATIENT
Start: 2017-11-26 | End: 2019-04-10

## 2017-11-26 ASSESSMENT — LIFESTYLE VARIABLES: DO YOU DRINK ALCOHOL: NO

## 2017-11-26 ASSESSMENT — PAIN SCALES - GENERAL: PAINLEVEL_OUTOF10: 7

## 2017-11-26 NOTE — ED NOTES
"Chief Complaint   Patient presents with   • Foot Pain     bilateral     /76   Pulse 63   Temp 36.1 °C (96.9 °F)   Resp 14   Ht 1.727 m (5' 8\")   Wt 73.6 kg (162 lb 4.1 oz)   SpO2 100%   BMI 24.67 kg/m²     Pt ambulatory to triage, steady on feet. Reports bilateral foot pain from toes to ankles, began approx one week ago. States has not been walking more than usual, no change in footwear. +PP bilaterally, no color change noted. Reports tingling sometimes. Has not taken any medication at home for pain. Pt returned to [facility], educated on triage process, instructed to notify staff of worsening symptoms.   "

## 2017-11-26 NOTE — ED PROVIDER NOTES
"ED Provider Note      CHIEF COMPLAINT   Chief Complaint   Patient presents with   • Foot Pain     bilateral       HPI   Betsy Pierce is a 26 y.o. male who presentsWith bilateral foot pain. This started about a week ago. Somewhat worse on the left than on the right. He localizes the pain over the arch of both feet. Feels like thousand pins and needles. The pain is much worse when he ambulates. He denies any trauma. Because of the pain he has taken the soles out of his slip on boots. This is not improved his symptoms. He has not had fever or swelling. He does suffer from chronic pain related to back spasm.    REVIEW OF SYSTEMS   Pertinent negative: As above    PAST MEDICAL HISTORY   Past Medical History:   Diagnosis Date   • Known health problems: none        SOCIAL HISTORY  Social History   Substance Use Topics   • Smoking status: Current Every Day Smoker     Packs/day: 0.50     Types: Cigarettes   • Smokeless tobacco: Never Used   • Alcohol use No      Comment: occ; quit 2015       ALLERGIES   See chart    PHYSICAL EXAM  VITAL SIGNS: /76   Pulse 63   Temp 36.1 °C (96.9 °F)   Resp 14   Ht 1.727 m (5' 8\")   Wt 73.6 kg (162 lb 4.1 oz)   SpO2 100%   BMI 24.67 kg/m²   Head: Atraumatic  Eyes: Eyes normal inspection  Neck: has full range of motion, normal inspection.  Constitutional: No acute distress   Cardiovascular: Normal heart rate. Pulses2+ dorsalis pedis  Thorax & Lungs: No respiratory distress  Skin: Intact  Musculoskeletal: Minor tenderness over the plantar aspect of both feet along the plantar fascia. No bony tenderness or deformity. No dorsal tenderness. Compartments soft.  Neurologic:  Normal sensory and motor      COURSE & MEDICAL DECISION MAKING  Patient presents with history and physical most consistent with plantar fasciitis. I have advised arch support. He was reluctant to changing his shoes or getting insoles. I've advised NSAIDs. I will give him a referral to a podiatrist. I've advised " him to return to the ER for fever, swelling, rash, or concern. I've given a prescription for naproxen.    Patient advised to see a primary provider for blood pressure management    FINAL IMPRESSION  1. Bilateral plantar fasciitis      This dictation was created using voice recognition software. The accuracy of the dictation is limited to the abilities of the software. I expect there may be some errors of grammar and possibly content. The nursing notes were reviewed and certain aspects of this information were incorporated into this note.      Electronically signed by: Pritesh Greenfield, 11/26/2017 7:48 AM

## 2017-11-26 NOTE — DISCHARGE INSTRUCTIONS
Plantar Fasciitis  Plantar fasciitis is a painful foot condition that affects the heel. It occurs when the band of tissue that connects the toes to the heel bone (plantar fascia) becomes irritated. This can happen after exercising too much or doing other repetitive activities (overuse injury). The pain from plantar fasciitis can range from mild irritation to severe pain that makes it difficult for you to walk or move. The pain is usually worse in the morning or after you have been sitting or lying down for a while.  CAUSES  This condition may be caused by:  · Standing for long periods of time.  · Wearing shoes that do not fit.  · Doing high-impact activities, including running, aerobics, and ballet.  · Being overweight.  · Having an abnormal way of walking (gait).  · Having tight calf muscles.  · Having high arches in your feet.  · Starting a new athletic activity.  SYMPTOMS  The main symptom of this condition is heel pain. Other symptoms include:  · Pain that gets worse after activity or exercise.  · Pain that is worse in the morning or after resting.  · Pain that goes away after you walk for a few minutes.  DIAGNOSIS  This condition may be diagnosed based on your signs and symptoms. Your health care provider will also do a physical exam to check for:  · A tender area on the bottom of your foot.  · A high arch in your foot.  · Pain when you move your foot.  · Difficulty moving your foot.  You may also need to have imaging studies to confirm the diagnosis. These can include:  · X-rays.  · Ultrasound.  · MRI.  TREATMENT   Treatment for plantar fasciitis depends on the severity of the condition. Your treatment may include:  · Rest, ice, and over-the-counter pain medicines to manage your pain.  · Exercises to stretch your calves and your plantar fascia.  · A splint that holds your foot in a stretched, upward position while you sleep (night splint).  · Physical therapy to relieve symptoms and prevent problems in the  future.  · Cortisone injections to relieve severe pain.  · Extracorporeal shockwave therapy (ESWT) to stimulate damaged plantar fascia with electrical impulses. It is often used as a last resort before surgery.  · Surgery, if other treatments have not worked after 12 months.  HOME CARE INSTRUCTIONS  · Take medicines only as directed by your health care provider.  · Avoid activities that cause pain.  · Roll the bottom of your foot over a bag of ice or a bottle of cold water. Do this for 20 minutes, 3-4 times a day.  · Perform simple stretches as directed by your health care provider.  · Try wearing athletic shoes with air-sole or gel-sole cushions or soft shoe inserts.  · Wear a night splint while sleeping, if directed by your health care provider.  · Keep all follow-up appointments with your health care provider.  PREVENTION   · Do not perform exercises or activities that cause heel pain.  · Consider finding low-impact activities if you continue to have problems.  · Lose weight if you need to.  The best way to prevent plantar fasciitis is to avoid the activities that aggravate your plantar fascia.  SEEK MEDICAL CARE IF:  · Your symptoms do not go away after treatment with home care measures.  · Your pain gets worse.  · Your pain affects your ability to move or do your daily activities.     This information is not intended to replace advice given to you by your health care provider. Make sure you discuss any questions you have with your health care provider.     Document Released: 09/12/2002 Document Revised: 05/03/2016 Document Reviewed: 10/28/2015  [employer] Interactive Patient Education ©2016 [employer]

## 2017-11-26 NOTE — ED NOTES
Discharge instructions given, pt verbalized understanding.  A&ox4.  VSS.  Ambulates out of ER with friend.

## 2017-12-26 ENCOUNTER — HOSPITAL ENCOUNTER (EMERGENCY)
Dept: HOSPITAL 8 - ED | Age: 27
Discharge: HOME | End: 2017-12-26
Payer: MEDICAID

## 2017-12-26 VITALS — SYSTOLIC BLOOD PRESSURE: 125 MMHG | DIASTOLIC BLOOD PRESSURE: 78 MMHG

## 2017-12-26 VITALS — HEIGHT: 67 IN | WEIGHT: 161.16 LBS | BODY MASS INDEX: 25.29 KG/M2

## 2017-12-26 DIAGNOSIS — L50.1: Primary | ICD-10-CM

## 2017-12-26 PROCEDURE — 99284 EMERGENCY DEPT VISIT MOD MDM: CPT

## 2018-03-12 ENCOUNTER — HOSPITAL ENCOUNTER (EMERGENCY)
Dept: HOSPITAL 8 - ED | Age: 28
LOS: 1 days | Discharge: HOME | End: 2018-03-13
Payer: MEDICAID

## 2018-03-12 VITALS — HEIGHT: 67 IN | WEIGHT: 158.29 LBS | BODY MASS INDEX: 24.84 KG/M2

## 2018-03-12 DIAGNOSIS — L50.8: Primary | ICD-10-CM
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
## 2018-03-12 DIAGNOSIS — T78.1XXA: ICD-10-CM

## 2018-03-12 PROCEDURE — 96372 THER/PROPH/DIAG INJ SC/IM: CPT

## 2018-03-12 PROCEDURE — 99284 EMERGENCY DEPT VISIT MOD MDM: CPT

## 2018-03-13 VITALS — DIASTOLIC BLOOD PRESSURE: 56 MMHG | SYSTOLIC BLOOD PRESSURE: 109 MMHG

## 2018-03-28 ENCOUNTER — HOSPITAL ENCOUNTER (EMERGENCY)
Dept: HOSPITAL 8 - ED | Age: 28
Discharge: LEFT BEFORE DISCHARGE | End: 2018-03-28
Payer: MEDICAID

## 2018-03-28 VITALS — HEIGHT: 68 IN | WEIGHT: 154.32 LBS | BODY MASS INDEX: 23.39 KG/M2

## 2018-03-28 VITALS — SYSTOLIC BLOOD PRESSURE: 106 MMHG | DIASTOLIC BLOOD PRESSURE: 62 MMHG

## 2018-03-28 DIAGNOSIS — G44.219: ICD-10-CM

## 2018-03-28 DIAGNOSIS — R10.84: Primary | ICD-10-CM

## 2018-03-28 DIAGNOSIS — R19.7: ICD-10-CM

## 2018-03-28 DIAGNOSIS — R11.2: ICD-10-CM

## 2018-03-28 LAB
ALBUMIN SERPL-MCNC: 3.2 G/DL (ref 3.4–5)
ALP SERPL-CCNC: 60 U/L (ref 45–117)
ALT SERPL-CCNC: 26 U/L (ref 12–78)
ANION GAP SERPL CALC-SCNC: 5 MMOL/L (ref 5–15)
BASOPHILS # BLD AUTO: 0.05 X10^3/UL (ref 0–0.1)
BASOPHILS NFR BLD AUTO: 0 % (ref 0–1)
BILIRUB SERPL-MCNC: 0.4 MG/DL (ref 0.2–1)
CALCIUM SERPL-MCNC: 8.5 MG/DL (ref 8.5–10.1)
CHLORIDE SERPL-SCNC: 107 MMOL/L (ref 98–107)
CREAT SERPL-MCNC: 1.11 MG/DL (ref 0.7–1.3)
EOSINOPHIL # BLD AUTO: 0.06 X10^3/UL (ref 0–0.4)
EOSINOPHIL NFR BLD AUTO: 1 % (ref 1–7)
ERYTHROCYTE [DISTWIDTH] IN BLOOD BY AUTOMATED COUNT: 13.3 % (ref 9.4–14.8)
LYMPHOCYTES # BLD AUTO: 3.52 X10^3/UL (ref 1–3.4)
LYMPHOCYTES NFR BLD AUTO: 29 % (ref 22–44)
MCH RBC QN AUTO: 28.4 PG (ref 27.5–34.5)
MCHC RBC AUTO-ENTMCNC: 33.6 G/DL (ref 33.2–36.2)
MCV RBC AUTO: 84.4 FL (ref 81–97)
MD: NO
MONOCYTES # BLD AUTO: 0.92 X10^3/UL (ref 0.2–0.8)
MONOCYTES NFR BLD AUTO: 8 % (ref 2–9)
NEUTROPHILS # BLD AUTO: 7.52 X10^3/UL (ref 1.8–6.8)
NEUTROPHILS NFR BLD AUTO: 62 % (ref 42–75)
PLATELET # BLD AUTO: 358 X10^3/UL (ref 130–400)
PMV BLD AUTO: 7.5 FL (ref 7.4–10.4)
PROT SERPL-MCNC: 6.4 G/DL (ref 6.4–8.2)
RBC # BLD AUTO: 5.66 X10^6/UL (ref 4.38–5.82)

## 2018-03-28 PROCEDURE — 96375 TX/PRO/DX INJ NEW DRUG ADDON: CPT

## 2018-03-28 PROCEDURE — 80053 COMPREHEN METABOLIC PANEL: CPT

## 2018-03-28 PROCEDURE — 36415 COLL VENOUS BLD VENIPUNCTURE: CPT

## 2018-03-28 PROCEDURE — 96374 THER/PROPH/DIAG INJ IV PUSH: CPT

## 2018-03-28 PROCEDURE — 99284 EMERGENCY DEPT VISIT MOD MDM: CPT

## 2018-03-28 PROCEDURE — 85025 COMPLETE CBC W/AUTO DIFF WBC: CPT

## 2018-03-28 PROCEDURE — 83690 ASSAY OF LIPASE: CPT

## 2019-04-10 ENCOUNTER — APPOINTMENT (OUTPATIENT)
Dept: RADIOLOGY | Facility: MEDICAL CENTER | Age: 29
End: 2019-04-10
Attending: EMERGENCY MEDICINE

## 2019-04-10 ENCOUNTER — HOSPITAL ENCOUNTER (EMERGENCY)
Facility: MEDICAL CENTER | Age: 29
End: 2019-04-10
Attending: EMERGENCY MEDICINE

## 2019-04-10 VITALS
HEIGHT: 68 IN | TEMPERATURE: 97.5 F | SYSTOLIC BLOOD PRESSURE: 127 MMHG | BODY MASS INDEX: 27.06 KG/M2 | RESPIRATION RATE: 18 BRPM | HEART RATE: 65 BPM | DIASTOLIC BLOOD PRESSURE: 83 MMHG | WEIGHT: 178.57 LBS | OXYGEN SATURATION: 100 %

## 2019-04-10 DIAGNOSIS — S63.601A SPRAIN OF RIGHT THUMB, UNSPECIFIED SITE OF FINGER, INITIAL ENCOUNTER: ICD-10-CM

## 2019-04-10 PROCEDURE — 73130 X-RAY EXAM OF HAND: CPT | Mod: RT

## 2019-04-10 PROCEDURE — 99284 EMERGENCY DEPT VISIT MOD MDM: CPT

## 2019-04-10 NOTE — ED PROVIDER NOTES
ED Provider Note    CHIEF COMPLAINT  Chief Complaint   Patient presents with   • Digit Pain     R hand, pt having pain in thumb, pt states when playing with kids his thumb got bent backward and has been painful and difficult to move ever since, no obvious deformity observed       HPI  Betsy Pierce is a 28 y.o. male who presents for evaluation of right hand pain.  The patient reports that his child accidentally pulled his right thumb backwards.  He reports pain and swelling of her cents.  Injury occurred a few days ago.  He has prior history of right thumb ligamentous injury when he was younger.  No associated numbness or tingling but he did report some transient paresthesias that have resolved.  No other symptoms reported    REVIEW OF SYSTEMS  See HPI for further details.  No fevers chills night sweats weight loss all other systems are negative.     PAST MEDICAL HISTORY  Past Medical History:   Diagnosis Date   • Known health problems: none        FAMILY HISTORY  Noncontributory    SOCIAL HISTORY  Social History     Social History   • Marital status: Single     Spouse name: N/A   • Number of children: N/A   • Years of education: N/A     Social History Main Topics   • Smoking status: Current Every Day Smoker     Packs/day: 0.50     Types: Cigarettes   • Smokeless tobacco: Never Used   • Alcohol use No      Comment: occ; quit 2015   • Drug use: Yes      Comment: marijuana   • Sexual activity: Not on file     Other Topics Concern   • Not on file     Social History Narrative   • No narrative on file     Denies IV drug  SURGICAL HISTORY  Past Surgical History:   Procedure Laterality Date   • HYDROCELECTOMY ADULT  2010       CURRENT MEDICATIONS  Home Medications     Reviewed by Corin Mariscal R.N. (Registered Nurse) on 04/10/19 at 0938  Med List Status: Complete   Medication Last Dose Status        Patient Elvin Taking any Medications                       ALLERGIES  No Known Allergies    PHYSICAL EXAM  VITAL SIGNS:  "/81   Pulse 68   Temp 36.4 °C (97.5 °F) (Temporal)   Resp 20   Ht 1.727 m (5' 8\")   Wt 81 kg (178 lb 9.2 oz)   SpO2 100%   BMI 27.15 kg/m²       Constitutional: Well developed, Well nourished, No acute distress, Non-toxic appearance.   HENT: Normocephalic, Atraumatic, Bilateral external ears normal, Oropharynx moist, No oral exudates, Nose normal.   Eyes: PERRLA, EOMI, Conjunctiva normal, No discharge.   Neck: Normal range of motion, No tenderness, Supple, No stridor.   Cardiovascular: Normal heart rate, Normal rhythm, No murmurs, No rubs, No gallops.   Thorax & Lungs: Normal breath sounds, No respiratory distress, No wheezing, No chest tenderness.   Abdomen: Bowel sounds normal, Soft, No tenderness, No masses, No pulsatile masses.   Skin: Warm, Dry, No erythema, No rash.   Back: No tenderness, No CVA tenderness.   Extremities: Right hand is notable for soft tissue swelling at the base of the thumb, swelling over the thenar eminence neurovascular exam is normal  Musculoskeletal: Good range of motion in all major joints. No tenderness to palpation or major deformities noted.   Neurologic: Alert & oriented x 3, Normal motor function, Normal sensory function, No focal deficits noted.   Psychiatric: Affect normal, Judgment normal, Mood normal.       RADIOLOGY/PROCEDURES  DX-HAND 3+ RIGHT   Final Result      1.  Unremarkable right hand series.            COURSE & MEDICAL DECISION MAKING  Pertinent Labs & Imaging studies reviewed. (See chart for details)  Patient has no evidence of fracture or dislocation.  He does have some tenderness overlying the ulnar collateral ligament on the right thumb therefore I will place him in a thumb spica and refer him to hand surgery if symptoms persist.  I recommended NSAIDs and ice    FINAL IMPRESSION  1.   1. Sprain of right thumb, unspecified site of finger, initial encounter             Electronically signed by: Carlos Lees, 4/10/2019 10:21 AM    "

## 2019-04-10 NOTE — ED NOTES
Pt given discharge instructions/splint given/ home care instructions explained, pt verbalized understanding of instructions given, pt ambulatory to [facility].

## 2019-04-10 NOTE — ED TRIAGE NOTES
.  Chief Complaint   Patient presents with   • Digit Pain     R hand, pt having pain in thumb, pt states when playing with kids his thumb got bent backward and has been painful and difficult to move ever since, no obvious deformity observed     Patient ambulatory to triage for above complaints; A&O X4; NAD observed.   Patient placed in lobby and educated to notify staff of new or worsening symptoms.

## 2020-08-18 ENCOUNTER — HOSPITAL ENCOUNTER (EMERGENCY)
Facility: MEDICAL CENTER | Age: 30
End: 2020-08-18
Attending: EMERGENCY MEDICINE
Payer: COMMERCIAL

## 2020-08-18 VITALS
DIASTOLIC BLOOD PRESSURE: 77 MMHG | OXYGEN SATURATION: 98 % | WEIGHT: 173.72 LBS | RESPIRATION RATE: 18 BRPM | SYSTOLIC BLOOD PRESSURE: 116 MMHG | TEMPERATURE: 97.4 F | HEART RATE: 68 BPM | BODY MASS INDEX: 27.27 KG/M2 | HEIGHT: 67 IN

## 2020-08-18 DIAGNOSIS — M54.50 LUMBAR BACK PAIN: ICD-10-CM

## 2020-08-18 DIAGNOSIS — S39.012A BACK STRAIN, INITIAL ENCOUNTER: ICD-10-CM

## 2020-08-18 DIAGNOSIS — M54.32 SCIATICA OF LEFT SIDE: ICD-10-CM

## 2020-08-18 PROCEDURE — 700111 HCHG RX REV CODE 636 W/ 250 OVERRIDE (IP): Mod: EDC | Performed by: EMERGENCY MEDICINE

## 2020-08-18 PROCEDURE — A9270 NON-COVERED ITEM OR SERVICE: HCPCS | Mod: EDC | Performed by: EMERGENCY MEDICINE

## 2020-08-18 PROCEDURE — 700102 HCHG RX REV CODE 250 W/ 637 OVERRIDE(OP): Mod: EDC | Performed by: EMERGENCY MEDICINE

## 2020-08-18 PROCEDURE — 99283 EMERGENCY DEPT VISIT LOW MDM: CPT | Mod: EDC

## 2020-08-18 RX ORDER — IBUPROFEN 600 MG/1
600 TABLET ORAL EVERY 6 HOURS PRN
Qty: 30 TAB | Refills: 0 | Status: SHIPPED | OUTPATIENT
Start: 2020-08-18

## 2020-08-18 RX ORDER — BACLOFEN 10 MG/1
10 TABLET ORAL 3 TIMES DAILY
Qty: 9 TAB | Refills: 0 | Status: SHIPPED | OUTPATIENT
Start: 2020-08-18 | End: 2020-08-21

## 2020-08-18 RX ORDER — BACLOFEN 10 MG/1
10 TABLET ORAL ONCE
Status: DISCONTINUED | OUTPATIENT
Start: 2020-08-18 | End: 2020-08-18 | Stop reason: HOSPADM

## 2020-08-18 RX ORDER — PREDNISONE 20 MG/1
40 TABLET ORAL DAILY
Qty: 8 TAB | Refills: 0 | Status: SHIPPED | OUTPATIENT
Start: 2020-08-18 | End: 2020-08-22

## 2020-08-18 RX ORDER — IBUPROFEN 600 MG/1
600 TABLET ORAL ONCE
Status: COMPLETED | OUTPATIENT
Start: 2020-08-18 | End: 2020-08-18

## 2020-08-18 RX ORDER — PREDNISONE 20 MG/1
60 TABLET ORAL DAILY
Status: DISCONTINUED | OUTPATIENT
Start: 2020-08-18 | End: 2020-08-18 | Stop reason: HOSPADM

## 2020-08-18 RX ADMIN — IBUPROFEN 600 MG: 600 TABLET, FILM COATED ORAL at 11:26

## 2020-08-18 RX ADMIN — PREDNISONE 60 MG: 20 TABLET ORAL at 11:26

## 2020-08-18 ASSESSMENT — LIFESTYLE VARIABLES
CONSUMPTION TOTAL: INCOMPLETE
TOTAL SCORE: 0
EVER FELT BAD OR GUILTY ABOUT YOUR DRINKING: NO
TOTAL SCORE: 0
TOTAL SCORE: 0
DOES PATIENT WANT TO STOP DRINKING: NO
HAVE PEOPLE ANNOYED YOU BY CRITICIZING YOUR DRINKING: NO
HAVE YOU EVER FELT YOU SHOULD CUT DOWN ON YOUR DRINKING: NO
DO YOU DRINK ALCOHOL: YES
EVER HAD A DRINK FIRST THING IN THE MORNING TO STEADY YOUR NERVES TO GET RID OF A HANGOVER: NO

## 2020-08-18 ASSESSMENT — ENCOUNTER SYMPTOMS
SHORTNESS OF BREATH: 0
COUGH: 0
BACK PAIN: 1
DIZZINESS: 0
NAUSEA: 0
HEADACHES: 0
FALLS: 0
FEVER: 0
NECK PAIN: 0
TINGLING: 0
VOMITING: 0

## 2020-08-18 NOTE — ED TRIAGE NOTES
Chief Complaint   Patient presents with   • Back Pain     Pt reports pain to be low back pain that has been increasing for the past 2 wks, states pick something up wrong when pain started. hx of back problems, pain to be shooting down left leg.      Pt/staff masked and in appropriate PPE during encounter.  Pt denies fever/travel or being in contact with anyone testing positive for COVID/Corona.

## 2020-08-18 NOTE — ED PROVIDER NOTES
"ED Provider Note    ED Provider Note    Primary care provider: Pcp Pt States None  Means of arrival: POV  History obtained from: patient  History limited by: None    CHIEF COMPLAINT  Chief Complaint   Patient presents with   • Back Pain     Pt reports pain to be low back pain that has been increasing for the past 2 wks, states pick something up wrong when pain started. hx of back problems, pain to be shooting down left leg.        HPI  Betsy Pierce is a 29 y.o. male who presents to the Emergency Department with a chief complaint of low back pain, left sided that also radiates into his left buttock.  He states about 2 weeks ago, he was doing a \"side job\" when he picked up a box, someone called his name and he turned, to the voice and suddenly felt pain.  He works as a  and work involves repetitive, lifting, bending, squatting and this exacerbates his symptoms.  He denies any current numbness or tingling.  No weakness in his leg.  No bowel or bladder incontinence or retention.  He does not report any saddle paresthesias.  No fever, cough congestion.  No known exposures to COVID-19.  No chest pain or shortness of breath.  He is otherwise been in his normal state of health.  He does have a history of \"back spasms\" but this feels different, lower than his previous spasms.  Patient reports a history of marijuana use, denies any drug use.  No alcohol or cigarette smoking.    REVIEW OF SYSTEMS  Review of Systems   Constitutional: Negative for fever.   HENT: Negative for congestion.    Respiratory: Negative for cough and shortness of breath.    Cardiovascular: Negative for chest pain.   Gastrointestinal: Negative for nausea and vomiting.   Musculoskeletal: Positive for back pain. Negative for falls and neck pain.   Neurological: Negative for dizziness, tingling and headaches.       PAST MEDICAL HISTORY   has a past medical history of Known health problems: none.    SURGICAL HISTORY   has a past surgical history " "that includes hydrocelectomy adult (2010).    SOCIAL HISTORY  Social History     Tobacco Use   • Smoking status: Current Every Day Smoker     Packs/day: 0.50     Types: Cigarettes   • Smokeless tobacco: Never Used   Substance Use Topics   • Alcohol use: No     Comment: occ; quit 2015   • Drug use: Yes     Comment: marijuana      Social History     Substance and Sexual Activity   Drug Use Yes    Comment: marijuana       FAMILY HISTORY  No family history on file.    CURRENT MEDICATIONS  Home Medications    **Home medications have not yet been reviewed for this encounter**         ALLERGIES  No Known Allergies    PHYSICAL EXAM  VITAL SIGNS: /77   Pulse 68   Temp 36.3 °C (97.4 °F) (Temporal)   Resp 18   Ht 1.702 m (5' 7\")   Wt 78.8 kg (173 lb 11.6 oz)   SpO2 98%   BMI 27.21 kg/m²   Vitals reviewed.  Constitutional: Patient is oriented to person, place, and time. Appears well-developed and well-nourished. MIld distress.  Lying on the gurney, in prone position for comfort.  Head: Normocephalic and atraumatic.   Eyes: Conjunctivae are normal.   Neck: Normal range of motion.   Cardiovascular: Normal rate, regular rhythm and normal heart sounds. Normal peripheral pulses, bilateral lower extremities.  Pulmonary/Chest: Effort normal and breath sounds normal. No respiratory distress, no wheezes, rhonchi, or rales.   Abdominal: Soft. Bowel sounds are normal. There is no tenderness.   Musculoskeletal: No edema and no tenderness, except over the left lumbar paraspinal muscles, left SI joint.  There are no overlying skin changes to this area.   Neurological: No focal deficits.   Skin: Skin is warm and dry. No erythema. No pallor.   Psychiatric: Patient has a normal mood and affect.     COURSE & MEDICAL DECISION MAKING  Pertinent Labs & Imaging studies reviewed. (See chart for details)    Obtained and reviewed past medical records.  Patient's last encounter was in April of last year he was seen in the emergency " department for digit pain, he hyperextended his thumb playing with his children.  Diagnosed with a sprain.  Prior to that, he was seen in the emergency department, multiple times in October and November 2017, for once for bilateral foot pain, once for abdominal pain and nausea, once for back pain, once for cough, nausea, congestion and headache, diagnosed with viral URI and again in early October last year for low back pain and muscle spasms.    10:33 AM - Patient seen and examined at bedside.  This is a pleasant 29-year-old male, who presents with a chief complaint of left lumbar paraspinal, muscle soreness with symptoms of sciatica and pain over his SI joint.  This is worsened with lifting, bending.  Unfortunately, his work schedule, requires this which is worsening his symptoms.  I have advised anti-inflammatories, muscle relaxer and steroids.  We treated with these in the ED and plan for discharge to home with these.  I do not see indication for imaging at this time.  The patient has no red flag symptoms or neurologic deficits.  No saddle paresthesias.  No weakness.  He is not an IV drug user.  He is afebrile with normal vital signs.    11:45 AM patient's reevaluated bedside.  He is feeling better, eager to go home.  He is driving and as a result, we will hold off on administration of also relaxers.  He will be given a prescription for muscle relaxers, baclofen as well as prednisone to be taken daily for the next 4 days and NSAIDs.  He is advised on limiting lifting.  He is given a work note to excuse him until this upcoming Monday.  He is advised on application of ice and avoiding activities that cause pain.  He is well-appearing and nontoxic.  Again, no red flag symptoms to suggest need for further imaging.  However, patient is given strict return precautions.  If he develops any neurologic deficits, fever, and worsening pain, he should return for reevaluation and he is agreeable to this plan of  care.    Patient to be discharged home in stable condition.    FINAL IMPRESSION  1. Lumbar back pain    2. Sciatica of left side    3. Back strain, initial encounter

## 2020-08-18 NOTE — ED NOTES
Pt to room 41. Reviewed and agree with triage note. Pt provided hospital gown, provided warm blanket and call light within reach. Chart up for ERP

## 2020-08-18 NOTE — Clinical Note
Betsy Pierce was seen and treated in our emergency department on 8/18/2020.  He may return to work on 08/24/2020.       If you have any questions or concerns, please don't hesitate to call.      Yudy Mccullough D.O.

## 2020-08-18 NOTE — ED NOTES
Pt medicated per ERP order.  Pt reports that he drove here and does not have a ride.  Will hold off until pt reports that he has a ride

## 2020-08-18 NOTE — ED NOTES
"Discharge instructions reviewed with pt regarding back pain, RX x 3 provided with instruction to not drink or drive while taking the Baclofen.  Work note provided.  Pt instructed on signs and symptoms to return to ED, instructed on importance of oral hydration, no questions regarding this.   Instructed to follow-up with   [facility], Emergency Dept  [address] [address] [phone]    If symptoms worsen    Pt has no questions at this time, /77   Pulse 68   Temp 36.3 °C (97.4 °F) (Temporal)   Resp 18   Ht 1.702 m (5' 7\")   Wt 78.8 kg (173 lb 11.6 oz)   SpO2 98%   BMI 27.21 kg/m²   Pt leaves alert, age appropriate and in NAD.          "

## 2021-11-20 ENCOUNTER — OFFICE VISIT (OUTPATIENT)
Dept: URGENT CARE | Facility: CLINIC | Age: 31
End: 2021-11-20
Payer: OTHER GOVERNMENT

## 2021-11-20 VITALS
HEIGHT: 68 IN | OXYGEN SATURATION: 98 % | RESPIRATION RATE: 16 BRPM | HEART RATE: 78 BPM | SYSTOLIC BLOOD PRESSURE: 126 MMHG | DIASTOLIC BLOOD PRESSURE: 78 MMHG | WEIGHT: 175 LBS | BODY MASS INDEX: 26.52 KG/M2 | TEMPERATURE: 97.6 F

## 2021-11-20 DIAGNOSIS — R52 BODY ACHES: ICD-10-CM

## 2021-11-20 DIAGNOSIS — U07.1 COVID-19 VIRUS INFECTION: ICD-10-CM

## 2021-11-20 LAB
EXTERNAL QUALITY CONTROL: NORMAL
SARS-COV+SARS-COV-2 AG RESP QL IA.RAPID: POSITIVE

## 2021-11-20 PROCEDURE — 99203 OFFICE O/P NEW LOW 30 MIN: CPT | Mod: CS | Performed by: NURSE PRACTITIONER

## 2021-11-20 PROCEDURE — 87426 SARSCOV CORONAVIRUS AG IA: CPT | Performed by: NURSE PRACTITIONER

## 2021-11-20 ASSESSMENT — ENCOUNTER SYMPTOMS
NAUSEA: 0
SHORTNESS OF BREATH: 0
EYE PAIN: 0
MYALGIAS: 0
DIARRHEA: 0
VOMITING: 0
COUGH: 0
HEADACHES: 1
DIZZINESS: 0
FEVER: 0
CHILLS: 1
SORE THROAT: 0

## 2021-11-20 NOTE — LETTER
November 20, 2021         Patient: Betsy Pierce   YOB: 1990   Date of Visit: 11/20/2021           To Whom it May Concern:    Betsy Pierce was seen in my clinic on 11/20/2021. He may return to work on 11/26/21.    If you have any questions or concerns, please don't hesitate to call.        Sincerely,           JEMAL Cottrell.  Electronically Signed

## 2021-11-20 NOTE — PROGRESS NOTES
Subjective:   Betsy Pierce is a 30 y.o. male who presents for Coronavirus Screening (began monday, stuffy nose, chills, fever, doctors note for work )      URI   This is a new problem. The current episode started in the past 7 days. The problem has been gradually improving. There has been no fever. Associated symptoms include congestion and headaches. Pertinent negatives include no chest pain, coughing, diarrhea, ear pain, nausea, rash, sore throat or vomiting. Associated symptoms comments: Body aches  . He has tried acetaminophen and sleep for the symptoms. The treatment provided mild relief.       Review of Systems   Constitutional: Positive for chills and malaise/fatigue. Negative for fever.   HENT: Positive for congestion. Negative for ear pain and sore throat.    Eyes: Negative for pain.   Respiratory: Negative for cough and shortness of breath.    Cardiovascular: Negative for chest pain.   Gastrointestinal: Negative for diarrhea, nausea and vomiting.   Genitourinary: Negative for hematuria.   Musculoskeletal: Negative for myalgias.   Skin: Negative for rash.   Neurological: Positive for headaches. Negative for dizziness.       Medications:    • ibuprofen Tabs    Allergies: Patient has no known allergies.    Problem List: Betsy Pierce does not have a problem list on file.    Surgical History:  Past Surgical History:   Procedure Laterality Date   • HYDROCELECTOMY ADULT  2010       Past Social Hx: Betsy Pierce  reports that he has been smoking cigarettes. He has been smoking about 0.50 packs per day. He has never used smokeless tobacco. He reports current drug use. He reports that he does not drink alcohol.     Past Family Hx:  Betsy Pierce family history is not on file.     Problem list, medications, and allergies reviewed by myself today in Epic.     Objective:     /78 (BP Location: Left arm, Patient Position: Sitting, BP Cuff Size: Adult)   Pulse 78   Temp 36.4 °C  "(97.6 °F) (Temporal)   Resp 16   Ht 1.715 m (5' 7.5\")   Wt 79.4 kg (175 lb)   SpO2 98%   BMI 27.00 kg/m²     Physical Exam  Vitals and nursing note reviewed.   Constitutional:       General: He is not in acute distress.     Appearance: He is well-developed.   HENT:      Head: Normocephalic and atraumatic.      Right Ear: Tympanic membrane and external ear normal.      Left Ear: Tympanic membrane and external ear normal.      Nose: Nose normal.      Right Sinus: No maxillary sinus tenderness or frontal sinus tenderness.      Left Sinus: No maxillary sinus tenderness or frontal sinus tenderness.      Mouth/Throat:      Mouth: Mucous membranes are moist.      Pharynx: Uvula midline. No posterior oropharyngeal erythema.      Tonsils: No tonsillar exudate or tonsillar abscesses.   Eyes:      General:         Right eye: No discharge.         Left eye: No discharge.      Conjunctiva/sclera: Conjunctivae normal.   Cardiovascular:      Rate and Rhythm: Normal rate.   Pulmonary:      Effort: Pulmonary effort is normal. No respiratory distress.      Breath sounds: Normal breath sounds.   Abdominal:      General: There is no distension.   Musculoskeletal:         General: Normal range of motion.   Skin:     General: Skin is warm and dry.   Neurological:      General: No focal deficit present.      Mental Status: He is alert and oriented to person, place, and time. Mental status is at baseline.      Gait: Gait (gait at baseline) normal.   Psychiatric:         Judgment: Judgment normal.         Assessment/Plan:     Diagnosis and associated orders:     1. COVID-19 virus infection  POCT SARS-COV Antigen ASA (Symptomatic Only)    CANCELED: SARS-CoV-2 PCR (24 hour In-House): Collect NP swab in [facility]   2. Body aches        Comments/MDM:     • Patient is a 30-year-old male present with the stated above, patient having undiagnosed new problem with unknown prognosis, patient did test positive for COVID-19.  Patient appears to be " clinically stable, vital signs stable, and appears to be in no acute respiratory distress.  Discussed viral etiology.  Symptom supportive care.  Patient will continue to quarantine per CDC guidelines.  Work letter provided.           Differential diagnosis, natural history, supportive care, and indications for immediate follow-up discussed.    Advised the patient to follow-up with the primary care physician for recheck, reevaluation, and consideration of further management.    Please note that this dictation was created using voice recognition software. I have made a reasonable attempt to correct obvious errors, but I expect that there are errors of grammar and possibly content that I did not discover before finalizing the note.    This note was electronically signed by Epi PERALTA.

## 2021-11-20 NOTE — LETTER
November 20, 2021         Patient: Betsy Pierce   YOB: 1990   Date of Visit: 11/20/2021           To Whom it May Concern:     Your employee was seen in our clinic today.  A concern for COVID-19 has been identified and testing is in progress.     We are asking you to excuse absences while following self-isolation protocol per Center for Disease Control (CDC) guidelines.  Your employee will be able to access test results through our electronic delivery system called [email or web address].     If the results of testing are negative, and once there has been no fever (temperature >100.4 F) for at least 72 hours without treatment, and no vomiting or diarrhea for at least 48 hours, then return to work is approved.    If the results of testing are positive then your employee will be contacted by the [facility] or [facility] department for further instructions on duration of self-isolation and return to work protocol. In general, this will also follow the CDC guidelines with a minimum of 10 days from the onset of symptoms and without fever, vomiting, or diarrhea as above.     In general, repeat testing is not necessary and not offered through our [facility].     This is the only note that will be provided from [facility] for this visit.  Your employee will require an appointment with a primary care provider if FMLA or disability forms are required.         If you have any questions please do not hesitate to call me at the phone number listed below.    Sincerely,          Epi Lux APRN  [phone]